# Patient Record
Sex: FEMALE | Race: WHITE
[De-identification: names, ages, dates, MRNs, and addresses within clinical notes are randomized per-mention and may not be internally consistent; named-entity substitution may affect disease eponyms.]

---

## 2020-09-21 ENCOUNTER — HOSPITAL ENCOUNTER (EMERGENCY)
Dept: HOSPITAL 54 - ER | Age: 85
LOS: 1 days | Discharge: HOME | End: 2020-09-22
Payer: MEDICARE

## 2020-09-21 VITALS — HEIGHT: 62 IN | WEIGHT: 145 LBS | BODY MASS INDEX: 26.68 KG/M2

## 2020-09-21 DIAGNOSIS — Z60.2: ICD-10-CM
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
## 2020-09-21 DIAGNOSIS — S62.646A: Primary | ICD-10-CM
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
## 2020-09-21 DIAGNOSIS — I10: ICD-10-CM

## 2020-09-21 SDOH — SOCIAL STABILITY - SOCIAL INSECURITY: PROBLEMS RELATED TO LIVING ALONE: Z60.2

## 2020-09-21 NOTE — NUR
PT AAOX4. [place] SPEAKING. ERICK C/O R PINKY S/P FALL. NO ACUTE DISTRESS 
NOTED. VSS. AMBULATING WITH STEADY GAIT.

## 2020-09-22 VITALS — SYSTOLIC BLOOD PRESSURE: 152 MMHG | DIASTOLIC BLOOD PRESSURE: 73 MMHG

## 2020-09-22 NOTE — NUR
Patient discharged to home in stable condition. Written and verbal after care 
instructions given. Patient verbalizes understanding of instruction and RX. Pt 
ambulated with steady gait, vss.